# Patient Record
Sex: FEMALE | Race: WHITE | URBAN - METROPOLITAN AREA
[De-identification: names, ages, dates, MRNs, and addresses within clinical notes are randomized per-mention and may not be internally consistent; named-entity substitution may affect disease eponyms.]

---

## 2017-03-06 ENCOUNTER — APPOINTMENT (RX ONLY)
Dept: URBAN - METROPOLITAN AREA CLINIC 167 | Facility: CLINIC | Age: 62
Setting detail: DERMATOLOGY
End: 2017-03-06

## 2017-03-06 DIAGNOSIS — Z41.9 ENCOUNTER FOR PROCEDURE FOR PURPOSES OTHER THAN REMEDYING HEALTH STATE, UNSPECIFIED: ICD-10-CM

## 2017-03-06 NOTE — HPI: OTHER
Condition:: Filler/btx-a tx
Please Describe Your Condition:: No known contraindications to soft tissue augmentation with PATEL; no known contraindications to treatment with botulinum toxin. See \"Cosmetic Procedure\" note in Attachments.

## 2018-07-23 ENCOUNTER — APPOINTMENT (RX ONLY)
Dept: URBAN - METROPOLITAN AREA CLINIC 167 | Facility: CLINIC | Age: 63
Setting detail: DERMATOLOGY
End: 2018-07-23

## 2018-07-23 DIAGNOSIS — Z41.9 ENCOUNTER FOR PROCEDURE FOR PURPOSES OTHER THAN REMEDYING HEALTH STATE, UNSPECIFIED: ICD-10-CM

## 2018-07-23 PROCEDURE — ? FILLERS

## 2018-07-23 NOTE — HPI: OTHER
Condition:: Filler treatment
Please Describe Your Condition:: comes in for Filler treatment . No known contraindications to treatment with soft tissue augumentation. See “Cosmetic Procedure” note in Attachments.

## 2018-07-23 NOTE — PROCEDURE: FILLERS
Nasolabial Folds Filler Volume In Cc: 0
Anesthesia Volume In Cc: 0.5
Lot #: [id number]
Expiration Date (Month Year): 05/19
Map Statment: See Attach Map for Complete Details
Anesthesia Type: 1% lidocaine without epinephrine
Lot #: [id number]
Detail Level: Zone
Additional Area 1 Location: Face
Include Cannula Information In Note?: No
Lot #: [id number]
Consent: Written consent obtained. Risks include but not limited to bruising, beading, irregular texture, ulceration, infection, allergic reaction, scar formation, incomplete augmentation, temporary nature, procedural pain.
Filler: Juvederm Ultra XC
Additional Area 1 Volume In Cc: 1
Post-Care Instructions: Patient instructed to apply ice to reduce swelling.
Additional Anesthesia Volume In Cc: 6

## 2018-08-16 ENCOUNTER — APPOINTMENT (RX ONLY)
Dept: URBAN - METROPOLITAN AREA CLINIC 167 | Facility: CLINIC | Age: 63
Setting detail: DERMATOLOGY
End: 2018-08-16

## 2018-08-16 DIAGNOSIS — L03.01 CELLULITIS OF FINGER: ICD-10-CM | Status: RESOLVING

## 2018-08-16 PROBLEM — L03.011 CELLULITIS OF RIGHT FINGER: Status: ACTIVE | Noted: 2018-08-16

## 2018-08-16 PROCEDURE — 99212 OFFICE O/P EST SF 10 MIN: CPT

## 2018-08-16 PROCEDURE — ? COUNSELING

## 2018-08-16 PROCEDURE — ? TREATMENT REGIMEN

## 2018-08-16 PROCEDURE — ? PRESCRIPTION

## 2018-08-16 RX ORDER — CEPHALEXIN 500 MG/1
CAPSULE ORAL
Qty: 21 | Refills: 0 | Status: ERX | COMMUNITY
Start: 2018-08-16

## 2018-08-16 RX ORDER — MUPIROCIN 20 MG/G
OINTMENT TOPICAL
Qty: 1 | Refills: 0 | Status: ERX | COMMUNITY
Start: 2018-08-16

## 2018-08-16 RX ADMIN — MUPIROCIN: 20 OINTMENT TOPICAL at 00:00

## 2018-08-16 RX ADMIN — CEPHALEXIN: 500 CAPSULE ORAL at 00:00

## 2018-08-16 ASSESSMENT — LOCATION SIMPLE DESCRIPTION DERM: LOCATION SIMPLE: RIGHT MIDDLE FINGER

## 2018-08-16 ASSESSMENT — LOCATION ZONE DERM: LOCATION ZONE: FINGER

## 2018-08-16 ASSESSMENT — LOCATION DETAILED DESCRIPTION DERM: LOCATION DETAILED: RIGHT DISTAL ULNAR DORSAL MIDDLE FINGER

## 2018-08-16 NOTE — PROCEDURE: TREATMENT REGIMEN
Detail Level: Detailed
Initiate Treatment: Mupirocin three times a day \\nKeflex 500mg three times a day for 7 days to take if she does not improve or gets a new infection (history of staph infection on left hand years ago requiring if antibiotics)

## 2018-08-16 NOTE — HPI: INFECTION (PARONYCHIA)
How Severe Is It?: mild
Is This A New Presentation, Or A Follow-Up?: Infection
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

## 2019-11-18 ENCOUNTER — APPOINTMENT (RX ONLY)
Dept: URBAN - METROPOLITAN AREA CLINIC 173 | Facility: CLINIC | Age: 64
Setting detail: DERMATOLOGY
End: 2019-11-18

## 2019-11-18 DIAGNOSIS — Z41.9 ENCOUNTER FOR PROCEDURE FOR PURPOSES OTHER THAN REMEDYING HEALTH STATE, UNSPECIFIED: ICD-10-CM

## 2019-11-18 PROCEDURE — ? FILLERS

## 2019-11-18 PROCEDURE — ? DYSPORT

## 2019-11-18 NOTE — PROCEDURE: FILLERS
Tear Troughs Filler  Volume In Cc: 0
Include Cannula Information In Note?: No
Expiration Date (Month Year): 01/20
Expiration Date (Month Year): 8/7/20
Detail Level: Zone
Filler: Juvederm Ultra Plus XC
Lot #: [id number]
Additional Area 1 Location: Face
Expiration Date (Month Year): 8/14/20
Additional Area 1 Volume In Cc: 1
Map Statment: See Attach Map for Complete Details
Consent: Written consent obtained. Risks include but not limited to bruising, beading, irregular texture, ulceration, infection, allergic reaction, scar formation, incomplete augmentation, temporary nature, procedural pain.
Anesthesia Type: 1% lidocaine without epinephrine
Post-Care Instructions: Patient instructed to apply ice to reduce swelling.
Anesthesia Volume In Cc: 0.5
Lot #: [id number]
Additional Anesthesia Volume In Cc: 6
Filler: Juvederm Ultra XC
Lot #: [id number]

## 2019-11-18 NOTE — HPI: OTHER
Condition:: Filler/ btx-a tx
Please Describe Your Condition:: comes in for Filler/ btx-a tx . No known contraindications to soft tissue augmentation with PATEL; no known contraindications to treatment with botulinum toxin. See “Cosmetic Procedure” note in Attachments.

## 2019-11-18 NOTE — PROCEDURE: DYSPORT
Inferior Lateral Orbicularis Oculi Units: 0
Dilution (U/ 0.1cc): 10
Additional Area 1 Units: 40
Consent: Written consent obtained. Risks include but not limited to lid/brow ptosis, bruising, swelling, diplopia, temporary effect, incomplete chemical denervation.
Detail Level: Zone
Expiration Date (Month Year): 4/20
Lot #: [id number]
Additional Area 1 Location: face

## 2019-12-10 ENCOUNTER — APPOINTMENT (RX ONLY)
Dept: URBAN - METROPOLITAN AREA CLINIC 166 | Facility: CLINIC | Age: 64
Setting detail: DERMATOLOGY
End: 2019-12-10

## 2019-12-10 DIAGNOSIS — L72.3 SEBACEOUS CYST: ICD-10-CM

## 2019-12-10 PROBLEM — D48.5 NEOPLASM OF UNCERTAIN BEHAVIOR OF SKIN: Status: ACTIVE | Noted: 2019-12-10

## 2019-12-10 PROCEDURE — ? COUNSELING

## 2019-12-10 PROCEDURE — ? TREATMENT REGIMEN

## 2019-12-10 PROCEDURE — 99213 OFFICE O/P EST LOW 20 MIN: CPT

## 2019-12-10 ASSESSMENT — LOCATION SIMPLE DESCRIPTION DERM: LOCATION SIMPLE: LEFT CHEEK

## 2019-12-10 ASSESSMENT — LOCATION ZONE DERM: LOCATION ZONE: FACE

## 2019-12-10 ASSESSMENT — LOCATION DETAILED DESCRIPTION DERM: LOCATION DETAILED: LEFT CENTRAL MALAR CHEEK

## 2019-12-10 NOTE — PROCEDURE: TREATMENT REGIMEN
Plan: We discussed the benign nature of these lesions, and I discouraged any intervention to remove them. Ms. Gonzalez stated she wants them removed. Given the subcutaneous nature, I informed her they would likely need some sort of excision (punch excision vs. extraction) and her efforts at topical retinoid are not helpful. Still, I discouraged any excisional procedure and explained the risk of scar. She inquired about laser vs. microneedling and, while I am not absolutely certain, I explained that these lesions are likely deeper than the effects of our lasers; but I referred her to her cosmetic derm (Dr. Henning) to discuss further. Microneedling might be an option, but again I told her I am not positive and would refer her back to Dr. Henning for further discussion for cosmetic removals. All questions answered.
Detail Level: Zone

## 2020-06-15 ENCOUNTER — APPOINTMENT (RX ONLY)
Dept: URBAN - METROPOLITAN AREA CLINIC 173 | Facility: CLINIC | Age: 65
Setting detail: DERMATOLOGY
End: 2020-06-15

## 2020-06-15 DIAGNOSIS — Z41.9 ENCOUNTER FOR PROCEDURE FOR PURPOSES OTHER THAN REMEDYING HEALTH STATE, UNSPECIFIED: ICD-10-CM

## 2020-06-15 PROCEDURE — ? DYSPORT

## 2020-06-15 PROCEDURE — ? BOTOX

## 2020-06-15 PROCEDURE — ? FILLERS

## 2020-06-15 NOTE — PROCEDURE: BOTOX
Show Additional Area 3: Yes
Glabellar Complex Units: 0
Show Lcl Units: No
Additional Area 1 Units: 30
Expiration Date (Month Year): 11/22
Detail Level: Zone
Lot #: [id number]
Consent: Written consent obtained. Risks include but not limited to lid/brow ptosis, bruising, swelling, diplopia, temporary effect, incomplete chemical denervation.
Dilution (U/0.1 Cc): 4
Additional Area 1 Location: Face

## 2020-06-15 NOTE — PROCEDURE: FILLERS
Brows Filler  Volume In Cc: 0
Additional Anesthesia Volume In Cc: 6
Additional Area 1 Location: Face
Detail Level: Zone
Additional Area 1 Volume In Cc: 1
Consent: Written consent obtained. Risks include but not limited to bruising, beading, irregular texture, ulceration, infection, allergic reaction, scar formation, incomplete augmentation, temporary nature, procedural pain.
Lot #: [id number] *[employer] Special *
Filler: Restylane
Post-Care Instructions: Patient instructed to apply ice to reduce swelling.
Expiration Date (Month Year): 12/19
Use Map Statement For Sites (Optional): No
Lot #: [id number]
Expiration Date (Month Year): 01/20
Map Statment: See Attach Map for Complete Details
Anesthesia Type: 1% lidocaine without epinephrine
Lot #: [id number]
Anesthesia Volume In Cc: 0.5
Expiration Date (Month Year): 03/22

## 2020-06-15 NOTE — HPI: OTHER
Condition:: Filler/btx-a tx
Please Describe Your Condition:: is being seen for Filler/btx-a tx . No known contraindications to soft tissue augmentation with PATEL; no known contraindications to treatment with botulinum toxin. See “Cosmetic Procedure”note in Attachments.

## 2021-08-01 NOTE — PROCEDURE: DYSPORT
Additional Area 4 Units: 0
Yes - the patient is able to be screened
Show Lateral Platysmal Band Units: Yes
Lot #: [id number]
Show Mentalis Units: No
Dilution (U/ 0.1cc): 10
Additional Area 1 Location: Face
Consent: Written consent obtained. Risks include but not limited to lid/brow ptosis, bruising, swelling, diplopia, temporary effect, incomplete chemical denervation.
Additional Area 1 Units: 16
Detail Level: Zone
Expiration Date (Month Year): 11/20

## 2021-12-14 ENCOUNTER — APPOINTMENT (RX ONLY)
Dept: URBAN - METROPOLITAN AREA CLINIC 173 | Facility: CLINIC | Age: 66
Setting detail: DERMATOLOGY
End: 2021-12-14

## 2021-12-14 DIAGNOSIS — Z41.9 ENCOUNTER FOR PROCEDURE FOR PURPOSES OTHER THAN REMEDYING HEALTH STATE, UNSPECIFIED: ICD-10-CM

## 2021-12-14 PROCEDURE — ? DYSPORT

## 2021-12-14 PROCEDURE — ? FILLERS

## 2021-12-14 PROCEDURE — ? BOTOX

## 2021-12-14 NOTE — PROCEDURE: DYSPORT
Show Lcl Units: No
Additional Area 6 Units: 0
Show Additional Area 3: Yes
Detail Level: Detailed
Expiration Date (Month Year): 6/30/2022
Price (Use Numbers Only, No Special Characters Or $): 65
Lot #: [id number]
Depressor Anguli Oris Units: 16
Consent: Written consent obtained. Risks include but not limited to lid/brow ptosis, bruising, swelling, diplopia, temporary effect, incomplete chemical denervation.
Dilution (U/ 0.1cc): 10
Post-Care Instructions: Patient instructed to not lie down for 4 hours and limit physical activity for 24 hours.

## 2021-12-14 NOTE — PROCEDURE: FILLERS
Expiration Date (Month Year): 04/30/21
Lateral Face Filler  Volume In Cc: 0
Lot #: [id number]
Anesthesia Type: 1% lidocaine with epinephrine 1:100,000 buffered with 8.4% sodium bicarbonate (1:9 ratio)
Include Cannula Information In Note?: No
Include Cannula Information In Note?: Yes
Expiration Date (Month Year): 01/31/22
Additional Area 1 Location: lips
Consent: Written consent obtained. Risks include but not limited to bruising, beading, irregular texture, ulceration, infection, allergic reaction, scar formation, incomplete augmentation, temporary nature, procedural pain.
Additional Anesthesia Volume In Cc: 6
Additional Area 1 Volume In Cc: 1
Include Cannula Size?: 27G
Topical Anesthesia?: 30% lidocaine, plasticized base
Post-Care Instructions: Patient instructed to apply ice to reduce swelling. Patient was given Cetirizine 10mg tablet in office for swelling.
Include Cannula Length?: 1.5 inch
Filler: Restylane Kysse
Additional Area 1 Location: Ear Lobes
Detail Level: Detailed
Price (Use Numbers Only, No Special Characters Or $): 803
Lot #: [id number]
Lot #: [id number]
Map Statment: See Attach Map for Complete Details
Expiration Date (Month Year): 11/30/2022

## 2021-12-14 NOTE — PROCEDURE: BOTOX
Left Periorbital Units: 0
Show Right And Left Pupillary Line Units: No
Show Anterior Platysmal Band Units: Yes
Post-Care Instructions: Patient instructed to not lie down for 4 hours and limit physical activity for 24 hours. Patient instructed not to travel by airplane for 48 hours.
Consent: Written consent obtained. Risks include but not limited to lid/brow ptosis, bruising, swelling, diplopia, temporary effect, incomplete chemical denervation.
Lot #: [id number]
Glabellar Complex Units: 22.5
Detail Level: Detailed
Dilution (U/0.1 Cc): 5
Price (Use Numbers Only, No Special Characters Or $): 946
Expiration Date (Month Year): 6/30/22
Periorbital Skin Units: 4
Depressor Anguli Oris Units: 16
Forehead Units: 3.5

## 2022-04-11 ENCOUNTER — APPOINTMENT (RX ONLY)
Dept: URBAN - METROPOLITAN AREA CLINIC 173 | Facility: CLINIC | Age: 67
Setting detail: DERMATOLOGY
End: 2022-04-11

## 2022-04-11 DIAGNOSIS — Z41.9 ENCOUNTER FOR PROCEDURE FOR PURPOSES OTHER THAN REMEDYING HEALTH STATE, UNSPECIFIED: ICD-10-CM

## 2022-04-11 PROCEDURE — ? DYSPORT

## 2022-04-11 PROCEDURE — ? BOTOX

## 2022-04-11 NOTE — PROCEDURE: BOTOX
Left Periorbital Units: 0
Show Right And Left Pupillary Line Units: No
Show Anterior Platysmal Band Units: Yes
Post-Care Instructions: Patient instructed to not lie down for 4 hours and limit physical activity for 24 hours. Patient instructed not to travel by airplane for 48 hours.
Consent: Written consent obtained. Risks include but not limited to lid/brow ptosis, bruising, swelling, diplopia, temporary effect, incomplete chemical denervation.
Lot #: [id number]
Glabellar Complex Units: 22.5
Detail Level: Detailed
Dilution (U/0.1 Cc): 5
Price (Use Numbers Only, No Special Characters Or $): 385
Expiration Date (Month Year): 05/2024
Periorbital Skin Units: 4
Depressor Anguli Oris Units: 16
Forehead Units: 3.5

## 2022-04-11 NOTE — PROCEDURE: DYSPORT
Show Lcl Units: No
Additional Area 6 Units: 0
Show Additional Area 3: Yes
Detail Level: Detailed
Expiration Date (Month Year): 10/30/2022
Price (Use Numbers Only, No Special Characters Or $): 46
Lot #: [id number]
Depressor Anguli Oris Units: 16
Consent: Written consent obtained. Risks include but not limited to lid/brow ptosis, bruising, swelling, diplopia, temporary effect, incomplete chemical denervation.
Dilution (U/ 0.1cc): 10
Post-Care Instructions: Patient instructed to not lie down for 4 hours and limit physical activity for 24 hours.

## 2022-10-03 ENCOUNTER — APPOINTMENT (RX ONLY)
Dept: URBAN - METROPOLITAN AREA CLINIC 173 | Facility: CLINIC | Age: 67
Setting detail: DERMATOLOGY
End: 2022-10-03

## 2022-10-03 DIAGNOSIS — Z41.9 ENCOUNTER FOR PROCEDURE FOR PURPOSES OTHER THAN REMEDYING HEALTH STATE, UNSPECIFIED: ICD-10-CM

## 2022-10-03 PROCEDURE — ? BOTOX

## 2022-10-03 PROCEDURE — ? DYSPORT

## 2022-10-03 PROCEDURE — ? FILLERS

## 2022-10-03 NOTE — PROCEDURE: DYSPORT
Show Nasal Units: Yes
Additional Area 4 Units: 0
Show Right And Left Brow Units: No
Price (Use Numbers Only, No Special Characters Or $): 89
Consent: Written consent obtained. Risks include but not limited to lid/brow ptosis, bruising, swelling, diplopia, temporary effect, incomplete chemical denervation.
Post-Care Instructions: Patient instructed to not lie down for 4 hours and limit physical activity for 24 hours.
Depressor Anguli Oris Units: 16
Detail Level: Detailed
Lot #: [id number]
Dilution (U/ 0.1cc): 10
Expiration Date (Month Year): 01/31/2023

## 2022-10-03 NOTE — PROCEDURE: FILLERS
Tear Troughs Filler  Volume In Cc: 0
Include Cannula Information In Note?: No
Expiration Date (Month Year): 01/31/22
Additional Area 1 Location: Lips
Include Cannula Size?: 27G
Additional Anesthesia Volume In Cc: 6
Additional Area 1 Volume In Cc: 1
Detail Level: Detailed
Include Cannula Length?: 1.5 inch
Price (Use Numbers Only, No Special Characters Or $): 353
Filler: Restylane Kysse
Additional Area 1 Location: Ear Lobes
Map Statment: See Attach Map for Complete Details
Aspiration Statement: Aspiration was performed prior to injecting site with filler.
Lot #: [id number]
Post-Care Instructions: Patient instructed to apply Alastin post injection serum
Lot #: [id number]
Consent: Written consent obtained. Risks include but not limited to bruising, beading, irregular texture, ulceration, infection, allergic reaction, scar formation, incomplete augmentation, temporary nature, procedural pain.
Expiration Date (Month Year): 3/31/23
Expiration Date (Month Year): 11/30/2022
Lot #: [id number]

## 2022-10-03 NOTE — PROCEDURE: BOTOX
Show Masseter Units: Yes
Show Ucl Units: No
Mentalis Units: 0
Consent: Written consent obtained. Risks include but not limited to lid/brow ptosis, bruising, swelling, diplopia, temporary effect, incomplete chemical denervation.
Post-Care Instructions: Patient instructed to not lie down for 4 hours and limit physical activity for 24 hours. Patient instructed not to travel by airplane for 48 hours.
Lot #: [id number]
Glabellar Complex Units: 22.5
Dilution (U/0.1 Cc): 5
Forehead Units: 3.5
Detail Level: Detailed
Expiration Date (Month Year): 06/2024
Periorbital Skin Units: 4
Price (Use Numbers Only, No Special Characters Or $): 595

## 2023-07-11 ENCOUNTER — APPOINTMENT (RX ONLY)
Dept: URBAN - METROPOLITAN AREA CLINIC 173 | Facility: CLINIC | Age: 68
Setting detail: DERMATOLOGY
End: 2023-07-11

## 2023-07-11 DIAGNOSIS — Z41.9 ENCOUNTER FOR PROCEDURE FOR PURPOSES OTHER THAN REMEDYING HEALTH STATE, UNSPECIFIED: ICD-10-CM

## 2023-07-11 PROCEDURE — ? DYSPORT

## 2023-07-11 PROCEDURE — ? BOTOX

## 2023-07-11 NOTE — PROCEDURE: BOTOX
Show Masseter Units: Yes
Show Ucl Units: No
Mentalis Units: 0
Consent: Written consent obtained. Risks include but not limited to lid/brow ptosis, bruising, swelling, diplopia, temporary effect, incomplete chemical denervation.
Post-Care Instructions: Patient instructed to not lie down for 4 hours and limit physical activity for 24 hours. Patient instructed not to travel by airplane for 48 hours.
Lot #: [id number]
Glabellar Complex Units: 22.5
Dilution (U/0.1 Cc): 5
Forehead Units: 3.5
Detail Level: Detailed
Expiration Date (Month Year): 12/2025
Periorbital Skin Units: 4
Price (Use Numbers Only, No Special Characters Or $): 181
Incrementing Botox Units: By 0.5 Units

## 2023-07-11 NOTE — PROCEDURE: DYSPORT
Show Nasal Units: Yes
Additional Area 4 Units: 0
Show Right And Left Brow Units: No
Price (Use Numbers Only, No Special Characters Or $): 46
Consent: Written consent obtained. Risks include but not limited to lid/brow ptosis, bruising, swelling, diplopia, temporary effect, incomplete chemical denervation.
Post-Care Instructions: Patient instructed to not lie down for 4 hours and limit physical activity for 24 hours.
Depressor Anguli Oris Units: 16
Detail Level: Detailed
Lot #: [id number]
Dilution (U/ 0.1cc): 10
Expiration Date (Month Year): 01/31/2023

## 2025-03-03 ENCOUNTER — APPOINTMENT (OUTPATIENT)
Dept: URBAN - METROPOLITAN AREA CLINIC 173 | Facility: CLINIC | Age: 70
Setting detail: DERMATOLOGY
End: 2025-03-03

## 2025-03-03 DIAGNOSIS — Z41.9 ENCOUNTER FOR PROCEDURE FOR PURPOSES OTHER THAN REMEDYING HEALTH STATE, UNSPECIFIED: ICD-10-CM

## 2025-03-03 PROCEDURE — ? COSMETIC CONSULTATION: FILLERS

## 2025-03-03 PROCEDURE — ? FILLERS

## 2025-03-03 NOTE — PROCEDURE: FILLERS
Tear Troughs Filler Volume In Cc: 0
Expiration Date (Month Year): 01/31/2025
Include Cannula Length?: 1.5 inch
Consent: Written consent obtained. Risks include but not limited to bruising, beading, irregular texture, ulceration, infection, allergic reaction, scar formation, incomplete augmentation, temporary nature, procedural pain.
Include Cannula Information In Note?: No
Post-Care Instructions: Patient instructed to apply Alastin post injection serum
Additional Area 1 Location: Ear Lobes
Detail Level: Detailed
Lot #: [id number]
Price (Use Numbers Only, No Special Characters Or $): 972
Expiration Date (Month Year): 11/30/2022
Filler: Restylane Kysse
Map Statment: See Attach Map for Complete Details
Lot #: [id number]
Vermilion Lips Filler Volume In Cc: 0.7
Expiration Date (Month Year): 01/31/22
Aspiration Statement: Aspiration was performed prior to injecting site with filler.
Include Cannula Size?: 27G
Lot #: [id number] *[employer] Special*